# Patient Record
Sex: FEMALE | Race: WHITE | Employment: FULL TIME | URBAN - METROPOLITAN AREA
[De-identification: names, ages, dates, MRNs, and addresses within clinical notes are randomized per-mention and may not be internally consistent; named-entity substitution may affect disease eponyms.]

---

## 2017-05-28 ENCOUNTER — HOSPITAL ENCOUNTER (OUTPATIENT)
Age: 37
Discharge: HOME | End: 2017-05-28
Attending: EMERGENCY MEDICINE
Payer: COMMERCIAL

## 2017-05-28 VITALS
WEIGHT: 250 LBS | BODY MASS INDEX: 35.79 KG/M2 | TEMPERATURE: 99 F | RESPIRATION RATE: 20 BRPM | OXYGEN SATURATION: 99 % | DIASTOLIC BLOOD PRESSURE: 99 MMHG | HEART RATE: 85 BPM | HEIGHT: 70 IN | SYSTOLIC BLOOD PRESSURE: 149 MMHG

## 2017-05-28 DIAGNOSIS — J40 BRONCHITIS: Primary | ICD-10-CM

## 2017-05-28 PROCEDURE — 87430 STREP A AG IA: CPT

## 2017-05-28 PROCEDURE — 99203 OFFICE O/P NEW LOW 30 MIN: CPT

## 2017-05-28 PROCEDURE — 99204 OFFICE O/P NEW MOD 45 MIN: CPT

## 2017-05-28 RX ORDER — AMOXICILLIN AND CLAVULANATE POTASSIUM 875; 125 MG/1; MG/1
1 TABLET, FILM COATED ORAL 2 TIMES DAILY
Qty: 20 TABLET | Refills: 0 | Status: SHIPPED | OUTPATIENT
Start: 2017-05-28 | End: 2017-05-28

## 2017-05-28 RX ORDER — AMOXICILLIN AND CLAVULANATE POTASSIUM 875; 125 MG/1; MG/1
1 TABLET, FILM COATED ORAL 2 TIMES DAILY
Qty: 20 TABLET | Refills: 0 | Status: SHIPPED | OUTPATIENT
Start: 2017-05-28 | End: 2017-06-07

## 2017-05-28 RX ORDER — BENZONATATE 100 MG/1
100 CAPSULE ORAL 3 TIMES DAILY PRN
Qty: 30 CAPSULE | Refills: 0 | Status: SHIPPED | OUTPATIENT
Start: 2017-05-28 | End: 2017-06-17 | Stop reason: ALTCHOICE

## 2017-05-28 NOTE — ED INITIAL ASSESSMENT (HPI)
Pt c/o cough and URI symptoms that started Friday. Pt states that she has been using her nebulizer at home. Pt states that she had a low grade fever and c/o sore throat. Pt c/o shortness of breath.   Pt is able to speak in full sentences and her pulse ox i

## 2017-05-28 NOTE — ED PROVIDER NOTES
Patient Seen in: [address]    History   Patient presents with:  Cough/URI    Stated Complaint: sob mucus     HPI    Patient is a 38-year-old female who presents to immediate care complaining of cough and congestion for Disorder Maternal Grandmother    • Heart Disorder Maternal Grandfather          Smoking Status: Never Smoker                      Smokeless Status: Never Used                        Alcohol Use: No                Review of Systems   Constitutional: Positiv warm and dry. Vitals reviewed.            ED Course   Labs Reviewed - No data to display    MDM           Disposition and Plan     Clinical Impression:  Bronchitis  (primary encounter diagnosis)    Disposition:  Discharge    Follow-up:  Ghassan Love

## 2017-06-19 PROBLEM — J45.20 MILD INTERMITTENT ASTHMA: Status: ACTIVE | Noted: 2017-06-19

## 2017-06-19 PROBLEM — J45.20 MILD INTERMITTENT ASTHMA (HCC): Status: ACTIVE | Noted: 2017-06-19

## 2017-06-19 PROCEDURE — 81001 URINALYSIS AUTO W/SCOPE: CPT | Performed by: INTERNAL MEDICINE

## 2017-06-19 PROCEDURE — 86376 MICROSOMAL ANTIBODY EACH: CPT | Performed by: INTERNAL MEDICINE

## 2017-08-04 PROCEDURE — 82746 ASSAY OF FOLIC ACID SERUM: CPT | Performed by: INTERNAL MEDICINE

## 2017-08-04 PROCEDURE — 82607 VITAMIN B-12: CPT | Performed by: INTERNAL MEDICINE

## 2017-08-04 PROCEDURE — 81001 URINALYSIS AUTO W/SCOPE: CPT | Performed by: INTERNAL MEDICINE

## 2017-08-23 PROCEDURE — 81001 URINALYSIS AUTO W/SCOPE: CPT | Performed by: INTERNAL MEDICINE

## 2017-09-22 PROCEDURE — 86255 FLUORESCENT ANTIBODY SCREEN: CPT | Performed by: INTERNAL MEDICINE

## 2017-09-22 PROCEDURE — 84590 ASSAY OF VITAMIN A: CPT | Performed by: INTERNAL MEDICINE

## 2017-09-22 PROCEDURE — 84446 ASSAY OF VITAMIN E: CPT | Performed by: INTERNAL MEDICINE

## 2017-09-22 PROCEDURE — 83516 IMMUNOASSAY NONANTIBODY: CPT | Performed by: INTERNAL MEDICINE

## 2017-09-22 PROCEDURE — 82784 ASSAY IGA/IGD/IGG/IGM EACH: CPT | Performed by: INTERNAL MEDICINE

## 2017-11-17 PROCEDURE — 88305 TISSUE EXAM BY PATHOLOGIST: CPT | Performed by: INTERNAL MEDICINE

## 2017-12-30 ENCOUNTER — LAB ENCOUNTER (OUTPATIENT)
Dept: LAB | Age: 37
End: 2017-12-30
Attending: PODIATRIST
Payer: COMMERCIAL

## 2017-12-30 DIAGNOSIS — B35.1 DERMATOPHYTOSIS OF NAIL: Primary | ICD-10-CM

## 2017-12-30 LAB
ALBUMIN SERPL-MCNC: 4 G/DL (ref 3.5–4.8)
ALP LIVER SERPL-CCNC: 72 U/L (ref 37–98)
ALT SERPL-CCNC: 45 U/L (ref 14–54)
AST SERPL-CCNC: 20 U/L (ref 15–41)
BILIRUB DIRECT SERPL-MCNC: 0.1 MG/DL (ref 0.1–0.5)
BILIRUB SERPL-MCNC: 0.5 MG/DL (ref 0.1–2)
M PROTEIN MFR SERPL ELPH: 7.5 G/DL (ref 6.1–8.3)

## 2017-12-30 PROCEDURE — 80076 HEPATIC FUNCTION PANEL: CPT

## 2018-01-12 PROBLEM — Z90.79 S/P TAH-BSO: Status: ACTIVE | Noted: 2018-01-12

## 2018-01-12 PROBLEM — Z90.722 S/P TAH-BSO: Status: ACTIVE | Noted: 2018-01-12

## 2018-01-12 PROBLEM — Z90.710 S/P TAH-BSO: Status: ACTIVE | Noted: 2018-01-12

## 2018-01-19 PROBLEM — M70.71 ILIOPSOAS BURSITIS OF BOTH HIPS: Status: ACTIVE | Noted: 2018-01-19

## 2018-01-19 PROBLEM — M70.72 ILIOPSOAS BURSITIS OF BOTH HIPS: Status: ACTIVE | Noted: 2018-01-19

## 2018-04-21 ENCOUNTER — HOSPITAL ENCOUNTER (OUTPATIENT)
Age: 38
Discharge: HOME | End: 2018-04-21
Attending: EMERGENCY MEDICINE
Payer: COMMERCIAL

## 2018-04-21 VITALS
SYSTOLIC BLOOD PRESSURE: 144 MMHG | RESPIRATION RATE: 18 BRPM | HEART RATE: 62 BPM | TEMPERATURE: 98 F | WEIGHT: 252 LBS | BODY MASS INDEX: 37.33 KG/M2 | OXYGEN SATURATION: 99 % | DIASTOLIC BLOOD PRESSURE: 84 MMHG | HEIGHT: 69 IN

## 2018-04-21 DIAGNOSIS — H65.01 RIGHT ACUTE SEROUS OTITIS MEDIA, RECURRENCE NOT SPECIFIED: Primary | ICD-10-CM

## 2018-04-21 PROCEDURE — 99214 OFFICE O/P EST MOD 30 MIN: CPT

## 2018-04-21 PROCEDURE — 99213 OFFICE O/P EST LOW 20 MIN: CPT

## 2018-04-21 RX ORDER — DIAZEPAM 2 MG/1
2 TABLET ORAL 3 TIMES DAILY PRN
Qty: 21 TABLET | Refills: 0 | Status: SHIPPED | OUTPATIENT
Start: 2018-04-21 | End: 2019-05-08

## 2018-04-21 NOTE — ED INITIAL ASSESSMENT (HPI)
Patient presents with complaints of right ear pain with muffled hearing. Took motrin 2 hours ago. Equilibrium is off and patient feels dizzy.  Started 2 days ago

## 2018-04-21 NOTE — ED PROVIDER NOTES
Patient Seen in: [address]    History   Patient presents with:  Ear Problem Pain (neurosensory)    Stated Complaint: right ear pain    HPI    This patient complains of right ear pain which has been present for the last Oral  SpO2: 99 %  O2 Device: None (Room air)    Current:/84   Pulse 62   Temp 97.7 °F (36.5 °C) (Oral)   Resp 18   Ht 175.3 cm (5' 9\")   Wt 114.3 kg   SpO2 99%   BMI 37.21 kg/m²         Physical Exam    The patient  is awake and alert nontoxic in ap Print, Disp-[employer] tablet, uma information technology

## 2018-09-04 PROCEDURE — 87040 BLOOD CULTURE FOR BACTERIA: CPT | Performed by: INTERNAL MEDICINE

## 2018-11-27 ENCOUNTER — HOSPITAL ENCOUNTER (OUTPATIENT)
Dept: MAMMOGRAPHY | Facility: HOSPITAL | Age: 38
Discharge: HOME | End: 2018-11-27
Attending: OBSTETRICS & GYNECOLOGY
Payer: COMMERCIAL

## 2018-11-27 ENCOUNTER — HOSPITAL ENCOUNTER (OUTPATIENT)
Dept: ULTRASOUND IMAGING | Facility: HOSPITAL | Age: 38
Discharge: HOME | End: 2018-11-27
Attending: OBSTETRICS & GYNECOLOGY
Payer: COMMERCIAL

## 2018-11-27 DIAGNOSIS — N63.10 BREAST MASS, RIGHT: ICD-10-CM

## 2018-11-27 PROCEDURE — 77066 DX MAMMO INCL CAD BI: CPT | Performed by: OBSTETRICS & GYNECOLOGY

## 2018-11-27 PROCEDURE — 77062 BREAST TOMOSYNTHESIS BI: CPT | Performed by: OBSTETRICS & GYNECOLOGY

## 2018-11-27 PROCEDURE — 76642 ULTRASOUND BREAST LIMITED: CPT | Performed by: OBSTETRICS & GYNECOLOGY

## 2019-03-26 ENCOUNTER — HOSPITAL ENCOUNTER (OUTPATIENT)
Dept: MRI IMAGING | Facility: HOSPITAL | Age: 39
Discharge: HOME | End: 2019-03-26
Attending: SURGERY
Payer: COMMERCIAL

## 2019-03-26 DIAGNOSIS — Z80.3 FAMILY HISTORY OF BREAST CANCER IN MOTHER: ICD-10-CM

## 2019-03-26 DIAGNOSIS — N63.0 BREAST LUMP IN FEMALE: ICD-10-CM

## 2019-03-26 DIAGNOSIS — N63.10 BREAST MASS, RIGHT: ICD-10-CM

## 2019-03-26 DIAGNOSIS — Z85.43 HISTORY OF OVARIAN CANCER: ICD-10-CM

## 2019-03-26 LAB — CREAT BLD-MCNC: 0.7 MG/DL (ref 0.5–1.5)

## 2019-03-26 PROCEDURE — 82565 ASSAY OF CREATININE: CPT

## 2019-03-26 PROCEDURE — 77049 MRI BREAST C-+ W/CAD BI: CPT | Performed by: SURGERY

## 2019-03-26 PROCEDURE — A9575 INJ GADOTERATE MEGLUMI 0.1ML: HCPCS | Performed by: SURGERY

## 2019-04-04 NOTE — PROGRESS NOTES
Paresh Bonilla, please notify patient no discrete mass for biopsy at this time  Will repeat MRI per radiologist recommendation in 6 months since there is no previous baseline  Please order MRI of the breasts for 10/2019

## 2019-04-25 PROBLEM — C56.9 OVARIAN CANCER (HCC): Status: ACTIVE | Noted: 2019-04-25

## 2019-04-25 PROBLEM — Z87.42 HISTORY OF ENDOMETRIOSIS: Status: ACTIVE | Noted: 2019-04-25

## 2019-05-08 PROBLEM — M72.2 PLANTAR FASCIITIS: Status: ACTIVE | Noted: 2019-05-08

## 2019-06-12 PROBLEM — E66.9 OBESITY (BMI 30-39.9): Status: ACTIVE | Noted: 2019-06-12

## 2019-08-29 PROBLEM — M54.16 LUMBAR RADICULOPATHY: Status: ACTIVE | Noted: 2019-08-29

## 2019-10-04 ENCOUNTER — HOSPITAL ENCOUNTER (OUTPATIENT)
Dept: MRI IMAGING | Facility: HOSPITAL | Age: 39
Discharge: HOME | End: 2019-10-04
Attending: SURGERY
Payer: COMMERCIAL

## 2019-10-04 DIAGNOSIS — N63.0 BREAST MASS: ICD-10-CM

## 2019-10-04 PROCEDURE — 77049 MRI BREAST C-+ W/CAD BI: CPT | Performed by: SURGERY

## 2019-10-04 PROCEDURE — 82565 ASSAY OF CREATININE: CPT

## 2019-10-04 PROCEDURE — A9575 INJ GADOTERATE MEGLUMI 0.1ML: HCPCS | Performed by: SURGERY

## 2019-10-05 NOTE — PROGRESS NOTES
Patient was notified with result of the MRI  Repeat MRI in 6 months for follow up of the nodules on the left breast  Her last mammogram was in 11/2018 and since she got her MRI will resume in 11/2020    Cathy Montana, please order bilateral MRI of the breasts for

## 2021-05-28 ENCOUNTER — HOSPITAL ENCOUNTER (OUTPATIENT)
Dept: GENERAL RADIOLOGY | Facility: HOSPITAL | Age: 41
Discharge: HOME | End: 2021-05-28
Attending: PHYSICIAN ASSISTANT
Payer: COMMERCIAL

## 2021-05-28 ENCOUNTER — OFFICE VISIT (OUTPATIENT)
Dept: SURGERY | Facility: CLINIC | Age: 41
End: 2021-05-28
Payer: COMMERCIAL

## 2021-05-28 VITALS — SYSTOLIC BLOOD PRESSURE: 132 MMHG | HEART RATE: 88 BPM | DIASTOLIC BLOOD PRESSURE: 80 MMHG

## 2021-05-28 DIAGNOSIS — M54.12 CERVICAL RADICULOPATHY: Primary | ICD-10-CM

## 2021-05-28 DIAGNOSIS — M54.12 CERVICAL RADICULOPATHY: ICD-10-CM

## 2021-05-28 PROCEDURE — 72052 X-RAY EXAM NECK SPINE 6/>VWS: CPT | Performed by: PHYSICIAN ASSISTANT

## 2021-05-28 PROCEDURE — 3075F SYST BP GE 130 - 139MM HG: CPT | Performed by: PHYSICIAN ASSISTANT

## 2021-05-28 PROCEDURE — 96372 THER/PROPH/DIAG INJ SC/IM: CPT | Performed by: PHYSICIAN ASSISTANT

## 2021-05-28 PROCEDURE — 99203 OFFICE O/P NEW LOW 30 MIN: CPT | Performed by: PHYSICIAN ASSISTANT

## 2021-05-28 PROCEDURE — 3079F DIAST BP 80-89 MM HG: CPT | Performed by: PHYSICIAN ASSISTANT

## 2021-05-28 RX ORDER — KETOROLAC TROMETHAMINE 30 MG/ML
30 INJECTION, SOLUTION INTRAMUSCULAR; INTRAVENOUS ONCE
Status: COMPLETED | OUTPATIENT
Start: 2021-05-28 | End: 2021-05-28

## 2021-05-28 RX ORDER — MELOXICAM 15 MG/1
15 TABLET ORAL DAILY
Qty: 30 TABLET | Refills: 0 | Status: SHIPPED | OUTPATIENT
Start: 2021-05-28

## 2021-05-28 RX ADMIN — KETOROLAC TROMETHAMINE 30 MG: 30 INJECTION, SOLUTION INTRAMUSCULAR; INTRAVENOUS at 10:30:00

## 2021-05-28 NOTE — H&P
Neurosurgery Clinic Visit  2021    Leland Shi PCP:  Chad Nru MD    1980 MRN [id number]       CC:  Left Arm numbness/tingling    HPI:    Niraj is a very pleasant 35year old female who presents with over 6 weeks of left arm symptom breast cancer 43   • Cancer Father         Prostate   • Other (Other) Father         renal insuff   • Heart Disorder Maternal Grandmother    • Heart Disorder Maternal Grandfather    • Other (Other) Sister         RA   • Diabetes Sister    • Ovarian treatment. F/u with Dr. You Bey in 4-6 weeks. Imaging was reviewed with the patient and explained in detail. The diagnosis, treatment options, and expectations were discussed. All questions were answered to satisfaction.   Total visit time = 30 Minute

## 2021-05-28 NOTE — PROGRESS NOTES
Pt has been having symptoms for over 2 months    Headaches, dizziness, numbness and tingling  in left hand and into fingers   is weak in left hand with things occasional slipping out of her hands    Denies tripping or falling, denies numbness in toes,

## 2021-06-09 ENCOUNTER — TELEPHONE (OUTPATIENT)
Dept: NEUROLOGY | Facility: CLINIC | Age: 41
End: 2021-06-09

## 2021-06-09 ENCOUNTER — OFFICE VISIT (OUTPATIENT)
Dept: PAIN CLINIC | Facility: CLINIC | Age: 41
End: 2021-06-09
Payer: COMMERCIAL

## 2021-06-09 VITALS
OXYGEN SATURATION: 98 % | DIASTOLIC BLOOD PRESSURE: 70 MMHG | SYSTOLIC BLOOD PRESSURE: 124 MMHG | TEMPERATURE: 97 F | RESPIRATION RATE: 16 BRPM | HEART RATE: 75 BPM | BODY MASS INDEX: 38.65 KG/M2 | HEIGHT: 70 IN | WEIGHT: 270 LBS

## 2021-06-09 DIAGNOSIS — M54.12 CERVICAL RADICULOPATHY: Primary | ICD-10-CM

## 2021-06-09 PROCEDURE — 3078F DIAST BP <80 MM HG: CPT | Performed by: ANESTHESIOLOGY

## 2021-06-09 PROCEDURE — 3074F SYST BP LT 130 MM HG: CPT | Performed by: ANESTHESIOLOGY

## 2021-06-09 PROCEDURE — 99243 OFF/OP CNSLTJ NEW/EST LOW 30: CPT | Performed by: ANESTHESIOLOGY

## 2021-06-09 PROCEDURE — 3008F BODY MASS INDEX DOCD: CPT | Performed by: ANESTHESIOLOGY

## 2021-06-09 RX ORDER — TRAMADOL HYDROCHLORIDE 50 MG/1
50 TABLET ORAL EVERY 8 HOURS PRN
Qty: 21 TABLET | Refills: 0 | Status: SHIPPED | OUTPATIENT
Start: 2021-06-09

## 2021-06-09 NOTE — H&P
Name: Kenna Abraham   : 1980   DOS: 2021     Chief complaint: Cervical radiculopathy    History of present illness:  Kenna Abraham is a 35year old female referred for evaluation of neck pain.   The patient reports a 2-month history of pain begi (PROAIR HFA) 108 (90 Base) MCG/ACT Inhalation Aero Soln Inhale 1 puff into the lungs every 6 (six) hours as needed for Wheezing.  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 Discussed treatment options for this including a trial of cervical epidural steroid injection. The patient would like to move forward. Risk and benefits discussed.   Additionally, I did offer the patient a short term prescription

## 2021-06-09 NOTE — TELEPHONE ENCOUNTER
Prior authorization request completed for: cervical epidural steroid injection    Authorization # No Authorization Required   Authorization dates: NA  CPT codes approved: 36096  Number of visits/dates of service approved: 1  Physician: Artur Lerner  Facility:Lab/

## 2021-06-09 NOTE — PROGRESS NOTES
HPI/Subjective:   Patient ID: Derrick Gutierrez is a 35year old female. HPI    History/Other:   Review of Systems  Current Outpatient Medications   Medication Sig Dispense Refill   • Meloxicam 15 MG Oral Tab Take 1 tablet (15 mg total) by mouth daily.  30 t OR Take 1 tablet by mouth every other day.          Allergies:  Zoloft                  NAUSEA AND VOMITING  Chicken                 DIARRHEA  Gluten Flour            DIARRHEA  Hydrocodone             NAUSEA AND VOMITING  Morphine [Morphine *        Objecti

## 2021-06-10 NOTE — TELEPHONE ENCOUNTER
[address]  PRE-PROCEDURE INSTRUCTIONS WITHOUT SEDATION            Procedure- RODNEY   Appointment Date: 6/15/2021  Check-In Time: 11:30 AM       ** TO AVOID CANCELLATION AND/OR RESCHEDULING: PLEASE CALL [facility] PRE-PROCEDURE LINE -[phone] • 81mg               24 hours  • Greater than 81mg but less than 325mg   5 days  • 325mg and greater                  7 days  • Coumadin       5 days  • Procedure may be cancelled if INR is elevated.    • Excedrin (with aspirin) 7 days  • Plavix (Clopidogre 20 minutes off) for comfort.       ** TO AVOID CANCELLATION AND/OR RESCHEDULING: PLEASE CALL [facility] PRE-PROCEDURE LINE -[phone] FOR DETAILED INSTRUCTIONS FIVE TO SEVEN DAYS PRIOR TO PROCEDURE**

## 2021-06-10 NOTE — TELEPHONE ENCOUNTER
Anesthesia Type Local    Provider Zan Palacios    Location Lab    Procedure Cervical OSIEL    Medical clearance requested (will send to Pain Navigator) No    Patient has Medicare coverage?  No    Comments (Please list entire procedure name here.) cervical epidural st

## 2021-06-15 ENCOUNTER — APPOINTMENT (OUTPATIENT)
Dept: GENERAL RADIOLOGY | Facility: HOSPITAL | Age: 41
End: 2021-06-15
Attending: ANESTHESIOLOGY
Payer: COMMERCIAL

## 2021-06-15 ENCOUNTER — HOSPITAL ENCOUNTER (OUTPATIENT)
Facility: HOSPITAL | Age: 41
Setting detail: HOSPITAL OUTPATIENT SURGERY
Discharge: HOME | End: 2021-06-15
Attending: ANESTHESIOLOGY | Admitting: ANESTHESIOLOGY
Payer: COMMERCIAL

## 2021-06-15 VITALS
HEART RATE: 53 BPM | RESPIRATION RATE: 16 BRPM | SYSTOLIC BLOOD PRESSURE: 142 MMHG | OXYGEN SATURATION: 100 % | DIASTOLIC BLOOD PRESSURE: 86 MMHG | TEMPERATURE: 98 F

## 2021-06-15 DIAGNOSIS — M54.12 CERVICAL RADICULOPATHY: ICD-10-CM

## 2021-06-15 PROCEDURE — B01B1ZZ FLUOROSCOPY OF SPINAL CORD USING LOW OSMOLAR CONTRAST: ICD-10-PCS | Performed by: ANESTHESIOLOGY

## 2021-06-15 PROCEDURE — 3E0S33Z INTRODUCTION OF ANTI-INFLAMMATORY INTO EPIDURAL SPACE, PERCUTANEOUS APPROACH: ICD-10-PCS | Performed by: ANESTHESIOLOGY

## 2021-06-15 RX ORDER — ONDANSETRON 2 MG/ML
4 INJECTION INTRAMUSCULAR; INTRAVENOUS ONCE AS NEEDED
Status: DISCONTINUED | OUTPATIENT
Start: 2021-06-15 | End: 2021-06-15

## 2021-06-15 RX ORDER — LIDOCAINE HYDROCHLORIDE 10 MG/ML
INJECTION, SOLUTION EPIDURAL; INFILTRATION; INTRACAUDAL; PERINEURAL
Status: DISCONTINUED | OUTPATIENT
Start: 2021-06-15 | End: 2021-06-15

## 2021-06-15 RX ORDER — DIPHENHYDRAMINE HYDROCHLORIDE 50 MG/ML
50 INJECTION INTRAMUSCULAR; INTRAVENOUS ONCE AS NEEDED
Status: DISCONTINUED | OUTPATIENT
Start: 2021-06-15 | End: 2021-06-15

## 2021-06-15 RX ORDER — SODIUM CHLORIDE 9 MG/ML
INJECTION INTRAVENOUS
Status: DISCONTINUED | OUTPATIENT
Start: 2021-06-15 | End: 2021-06-15

## 2021-06-15 RX ORDER — DEXAMETHASONE SODIUM PHOSPHATE 10 MG/ML
INJECTION, SOLUTION INTRAMUSCULAR; INTRAVENOUS
Status: DISCONTINUED | OUTPATIENT
Start: 2021-06-15 | End: 2021-06-15

## 2021-06-15 RX ORDER — SODIUM CHLORIDE, SODIUM LACTATE, POTASSIUM CHLORIDE, CALCIUM CHLORIDE 600; 310; 30; 20 MG/100ML; MG/100ML; MG/100ML; MG/100ML
100 INJECTION, SOLUTION INTRAVENOUS CONTINUOUS
Status: DISCONTINUED | OUTPATIENT
Start: 2021-06-15 | End: 2021-06-15

## 2021-06-15 NOTE — H&P
History & Physical Examination    Patient Name: Dagoberto Madden  MRN: [id number]  CSN: [id number]  YOB: 1980    Pre-Operative Diagnosis:  Cervical radiculopathy [M54.12]    Present Illness: Patient with cervical radiculopathy for epidural steroi Disp: 5 tablet, Rfl: 0, Unknown at Unknown time  hydrochlorothiazide 12.5 MG Oral Cap, Take 1 capsule (12.5 mg total) by mouth every morning., Disp: 90 capsule, Rfl: 3, 6/8/2021  albuterol sulfate (2.5 MG/3ML) 0.083% Inhalation Nebu Soln, Take 3 mL (2.5 mg Prostate   • Other (Other) Father         renal insuff   • Heart Disorder Maternal Grandmother    • Heart Disorder Maternal Grandfather    • Other (Other) Sister         RA   • Diabetes Sister    • Ovarian Cancer Self 21        WITH CHEMO   • Breast Cancer

## 2021-06-15 NOTE — OR NURSING
Patient injection site appears clean and intact Patient states that she took her bp, this am and it was 152 85 she states that she takes her meds at night and really dont monitor it to compare, she states that she is on her way to work and denies headaches, floaters, blurred vision, dizziness etc. She do however verbalized that theres a dullness in the front of her head. Patient states that she has not taken the meloxacam and she wanted to let Dr. Carlson Know. Patient advised that  is not here on fridays however message will be sent to her but in the interim I advised her per  to decrease her salt intake, increase her water, and monitor bp once or twice a day and if she should feel bad or exp any of the above mentioned symptoms to seek medical att be it the u/c or the ER and monitor and track over the weekend and call on Monday if they are still elevated and patient expressed understanding.emma

## 2021-06-15 NOTE — OPERATIVE REPORT
[facility]  Operative Report  6/15/2021     Pablo Hassan Patient Status:  Hospital Outpatient Surgery    1980 MRN [id number]   Location [address] [place] [address] Attending Guy Guzman MD   [facility] Day # 0 PCP Dayo Molina recovered and was discharged to a responsible adult after discharge criteria were met. Complications: None. Follow up: The patient was followed in the pain clinic as needed basis.           Arie Velez MD

## 2021-06-15 NOTE — OR NURSING
PATIENT VITAL SIGNS STABLE AND PATIENT HAD NO C/O PAIN OR NAUSEA DURING POST-OP RECOVERY. ICE PACK APPLIED TO NECK .

## 2021-07-02 ENCOUNTER — OFFICE VISIT (OUTPATIENT)
Dept: SURGERY | Facility: CLINIC | Age: 41
End: 2021-07-02
Payer: COMMERCIAL

## 2021-07-02 VITALS — HEART RATE: 76 BPM | DIASTOLIC BLOOD PRESSURE: 80 MMHG | SYSTOLIC BLOOD PRESSURE: 126 MMHG

## 2021-07-02 DIAGNOSIS — M54.12 CERVICAL RADICULOPATHY: Primary | ICD-10-CM

## 2021-07-02 PROCEDURE — 3074F SYST BP LT 130 MM HG: CPT | Performed by: PHYSICIAN ASSISTANT

## 2021-07-02 PROCEDURE — 3079F DIAST BP 80-89 MM HG: CPT | Performed by: PHYSICIAN ASSISTANT

## 2021-07-02 PROCEDURE — 99213 OFFICE O/P EST LOW 20 MIN: CPT | Performed by: PHYSICIAN ASSISTANT

## 2021-07-02 RX ORDER — HYDROCODONE BITARTRATE AND ACETAMINOPHEN 5; 325 MG/1; MG/1
1 TABLET ORAL EVERY 6 HOURS PRN
Qty: 20 TABLET | Refills: 0 | Status: SHIPPED | OUTPATIENT
Start: 2021-07-02

## 2021-07-02 NOTE — PROGRESS NOTES
Neurosurgery Clinic Visit  2021    Prince Thornton PCP:  Ángel Rivera MD    1980 MRN [id number]     CC:  Left Arm numbness/tingling    HPI:    Aurora Duckworth is here for f/u. She has not been able to start PT due to her work hours.   She had a vasov History    Socioeconomic History      Marital status:       Spouse name: Not on file      Number of children: Not on file      Years of education: Not on file      Highest education level: Not on file    Tobacco Use      Smoking status: Never Smoker Triceps Patellar Ankle Leo's Clonus   Right 2+ 2+ 2+ 2+ 2+ No No   Left 2+ 2+ 2+ 2+ 2+ No No      A/P:     1. Cervical radiculopathy       Sheri Calvo is suffering from cervical radiculopathy secondary to foraminal stenosis.   Her symptoms are more C6 but sh

## 2021-07-02 NOTE — PROGRESS NOTES
Patient here for follow up visit to be evaluated post nerve block. Patient reports pain in the left shoulder, neck, radiating to the arm. Patient reported no relief from the nerve block. Pain began approximately 5 months ago. Pain is constant.  Patient

## 2021-10-06 ENCOUNTER — OFFICE VISIT (OUTPATIENT)
Dept: SURGERY | Facility: CLINIC | Age: 41
End: 2021-10-06
Payer: COMMERCIAL

## 2021-10-06 VITALS
SYSTOLIC BLOOD PRESSURE: 122 MMHG | HEIGHT: 70 IN | WEIGHT: 280 LBS | BODY MASS INDEX: 40.09 KG/M2 | DIASTOLIC BLOOD PRESSURE: 80 MMHG

## 2021-10-06 DIAGNOSIS — M47.22 CERVICAL SPONDYLOSIS WITH RADICULOPATHY: Primary | ICD-10-CM

## 2021-10-06 PROCEDURE — 3008F BODY MASS INDEX DOCD: CPT | Performed by: NEUROLOGICAL SURGERY

## 2021-10-06 PROCEDURE — 3079F DIAST BP 80-89 MM HG: CPT | Performed by: NEUROLOGICAL SURGERY

## 2021-10-06 PROCEDURE — 3074F SYST BP LT 130 MM HG: CPT | Performed by: NEUROLOGICAL SURGERY

## 2021-10-06 PROCEDURE — 99214 OFFICE O/P EST MOD 30 MIN: CPT | Performed by: NEUROLOGICAL SURGERY

## 2021-10-06 NOTE — PROGRESS NOTES
Was doing well with PT, has been doing stretches at home  Until about 3 weeks ago, had a lot of symptoms back, but not as severe as they were before    Now having issues with Right side, where before was only Left side

## 2021-10-06 NOTE — PROGRESS NOTES
[facility]  Neurological Surgery Follow Up Clinic Note    Walker Dress 35year old, female    1980 MRN [id number]   PCP Kristan Mackey MD Allergies   Zoloft                  NAUSEA AND VOMITING  Chicken                 Hernán Davis right side involving the thumb and index finger, that leads me to believe that there may be a new issue with the C5-6 level which may need to be included.   An anterior cervical discectomy with fusion (ACDF) is a common surgical procedure to treat damaged c going well. Maintaining a healthy attitude, a well-balanced diet, and getting plenty of rest are also great ways to speed up recovery. I do not think the patient is a good candidate for total disc arthropathy at C6-7.   Given her habitus imaging that dakotah

## 2022-11-14 ENCOUNTER — HOSPITAL ENCOUNTER (OUTPATIENT)
Age: 42
Discharge: HOME | End: 2022-11-14
Payer: COMMERCIAL

## 2022-11-14 ENCOUNTER — APPOINTMENT (OUTPATIENT)
Dept: GENERAL RADIOLOGY | Age: 42
End: 2022-11-14
Attending: NURSE PRACTITIONER
Payer: COMMERCIAL

## 2022-11-14 VITALS
RESPIRATION RATE: 20 BRPM | SYSTOLIC BLOOD PRESSURE: 135 MMHG | TEMPERATURE: 98 F | DIASTOLIC BLOOD PRESSURE: 82 MMHG | OXYGEN SATURATION: 100 % | HEART RATE: 71 BPM

## 2022-11-14 DIAGNOSIS — M54.16 LUMBAR RADICULOPATHY: Primary | ICD-10-CM

## 2022-11-14 LAB
BILIRUB UR QL STRIP: NEGATIVE
COLOR UR: YELLOW
GLUCOSE UR STRIP-MCNC: NEGATIVE MG/DL
KETONES UR STRIP-MCNC: NEGATIVE MG/DL
NITRITE UR QL STRIP: NEGATIVE
PH UR STRIP: 6.5 [PH]
PROT UR STRIP-MCNC: NEGATIVE MG/DL
SP GR UR STRIP: 1.02
UROBILINOGEN UR STRIP-ACNC: <2 MG/DL

## 2022-11-14 PROCEDURE — 81002 URINALYSIS NONAUTO W/O SCOPE: CPT | Performed by: NURSE PRACTITIONER

## 2022-11-14 PROCEDURE — 96372 THER/PROPH/DIAG INJ SC/IM: CPT | Performed by: NURSE PRACTITIONER

## 2022-11-14 PROCEDURE — 72100 X-RAY EXAM L-S SPINE 2/3 VWS: CPT | Performed by: NURSE PRACTITIONER

## 2022-11-14 PROCEDURE — 99203 OFFICE O/P NEW LOW 30 MIN: CPT | Performed by: NURSE PRACTITIONER

## 2022-11-14 RX ORDER — PREDNISONE 20 MG/1
40 TABLET ORAL DAILY
Qty: 8 TABLET | Refills: 0 | Status: SHIPPED | OUTPATIENT
Start: 2022-11-14 | End: 2022-11-18

## 2022-11-14 RX ORDER — CYCLOBENZAPRINE HCL 10 MG
10 TABLET ORAL ONCE
Status: COMPLETED | OUTPATIENT
Start: 2022-11-14 | End: 2022-11-14

## 2022-11-14 RX ORDER — PREDNISONE 20 MG/1
60 TABLET ORAL ONCE
Status: COMPLETED | OUTPATIENT
Start: 2022-11-14 | End: 2022-11-14

## 2022-11-14 RX ORDER — KETOROLAC TROMETHAMINE 30 MG/ML
30 INJECTION, SOLUTION INTRAMUSCULAR; INTRAVENOUS ONCE
Status: COMPLETED | OUTPATIENT
Start: 2022-11-14 | End: 2022-11-14

## 2022-11-14 RX ORDER — CYCLOBENZAPRINE HCL 10 MG
10 TABLET ORAL 3 TIMES DAILY PRN
Qty: 20 TABLET | Refills: 0 | Status: SHIPPED | OUTPATIENT
Start: 2022-11-14 | End: 2022-11-18

## 2022-11-14 NOTE — ED INITIAL ASSESSMENT (HPI)
Pt c/o mid low back pain radiating to buttocks and down B legs after having a coughing fit x3 days. States worse with movement. States seen by pulmonologist this am.  No falls or injury. No bowel or bladder incontinence. No urinary symptoms.

## 2022-11-14 NOTE — DISCHARGE INSTRUCTIONS
Make sure to stay well hydrated with clear fluids. Take the steroid daily as directed however do not begin this until tomorrow as you had your first dose here at the immediate care today. If additional pain control is needed, you may use the muscle relaxant as directed however be aware this medication can cause drowsiness. You can use Tylenol or Motrin every 6 hours to control fever or discomfort. You can use both Tylenol and Motrin, but alternate them so that you're getting one every 3 hours. Warm salt water gargles, throat lozenges, and warmed beverages can be additionally helpful for a sore throat. Using a humidifier in the bedroom at night, and sleeping propped up on pillows/somewhat of an incline can also be helpful. Cover your cough and wash your hands frequently to prevent the spread of infection. Follow up with your primary care provider in the next 2-3 days. Seek additional care in the ER for fever that is not controlled with Tylenol and Motrin, difficulty breathing or shortness of breath, chest pain, or any new/worsening symptoms.

## 2025-01-17 RX ORDER — LEVOTHYROXINE SODIUM 50 UG/1
50 TABLET ORAL
COMMUNITY

## 2025-01-20 ENCOUNTER — HOSPITAL ENCOUNTER (OUTPATIENT)
Facility: HOSPITAL | Age: 45
Discharge: HOME | End: 2025-01-21
Attending: OTOLARYNGOLOGY | Admitting: OTOLARYNGOLOGY
Payer: COMMERCIAL

## 2025-01-20 ENCOUNTER — ANESTHESIA (OUTPATIENT)
Dept: SURGERY | Facility: HOSPITAL | Age: 45
End: 2025-01-20
Payer: COMMERCIAL

## 2025-01-20 ENCOUNTER — ANESTHESIA EVENT (OUTPATIENT)
Dept: SURGERY | Facility: HOSPITAL | Age: 45
End: 2025-01-20
Payer: COMMERCIAL

## 2025-01-20 PROBLEM — Z98.890 S/P NASAL SEPTOPLASTY: Status: ACTIVE | Noted: 2025-01-20

## 2025-01-20 PROCEDURE — 94640 AIRWAY INHALATION TREATMENT: CPT

## 2025-01-20 PROCEDURE — 88305 TISSUE EXAM BY PATHOLOGIST: CPT | Performed by: OTOLARYNGOLOGY

## 2025-01-20 PROCEDURE — 09BL0ZZ EXCISION OF NASAL TURBINATE, OPEN APPROACH: ICD-10-PCS | Performed by: OTOLARYNGOLOGY

## 2025-01-20 PROCEDURE — 09BM0ZZ EXCISION OF NASAL SEPTUM, OPEN APPROACH: ICD-10-PCS | Performed by: OTOLARYNGOLOGY

## 2025-01-20 PROCEDURE — 09BK0ZZ EXCISION OF NASAL MUCOSA AND SOFT TISSUE, OPEN APPROACH: ICD-10-PCS | Performed by: OTOLARYNGOLOGY

## 2025-01-20 PROCEDURE — 94644 CONT INHLJ TX 1ST HOUR: CPT

## 2025-01-20 RX ORDER — LEVOTHYROXINE SODIUM 50 UG/1
50 TABLET ORAL
Status: DISCONTINUED | OUTPATIENT
Start: 2025-01-21 | End: 2025-01-21

## 2025-01-20 RX ORDER — MIDAZOLAM HYDROCHLORIDE 1 MG/ML
1 INJECTION INTRAMUSCULAR; INTRAVENOUS EVERY 5 MIN PRN
Status: DISCONTINUED | OUTPATIENT
Start: 2025-01-20 | End: 2025-01-20 | Stop reason: HOSPADM

## 2025-01-20 RX ORDER — ONDANSETRON 2 MG/ML
4 INJECTION INTRAMUSCULAR; INTRAVENOUS EVERY 6 HOURS PRN
Status: DISCONTINUED | OUTPATIENT
Start: 2025-01-20 | End: 2025-01-20 | Stop reason: HOSPADM

## 2025-01-20 RX ORDER — CEFAZOLIN SODIUM 1 G/3ML
INJECTION, POWDER, FOR SOLUTION INTRAMUSCULAR; INTRAVENOUS AS NEEDED
Status: DISCONTINUED | OUTPATIENT
Start: 2025-01-20 | End: 2025-01-20 | Stop reason: SURG

## 2025-01-20 RX ORDER — ACETAMINOPHEN 500 MG
1000 TABLET ORAL ONCE
Status: DISCONTINUED | OUTPATIENT
Start: 2025-01-20 | End: 2025-01-20 | Stop reason: HOSPADM

## 2025-01-20 RX ORDER — PROCHLORPERAZINE EDISYLATE 5 MG/ML
5 INJECTION INTRAMUSCULAR; INTRAVENOUS EVERY 8 HOURS PRN
Status: DISCONTINUED | OUTPATIENT
Start: 2025-01-20 | End: 2025-01-20 | Stop reason: HOSPADM

## 2025-01-20 RX ORDER — LIDOCAINE HYDROCHLORIDE AND EPINEPHRINE 10; 10 MG/ML; UG/ML
INJECTION, SOLUTION INFILTRATION; PERINEURAL AS NEEDED
Status: DISCONTINUED | OUTPATIENT
Start: 2025-01-20 | End: 2025-01-20 | Stop reason: HOSPADM

## 2025-01-20 RX ORDER — ROCURONIUM BROMIDE 10 MG/ML
INJECTION, SOLUTION INTRAVENOUS AS NEEDED
Status: DISCONTINUED | OUTPATIENT
Start: 2025-01-20 | End: 2025-01-20 | Stop reason: SURG

## 2025-01-20 RX ORDER — SODIUM CHLORIDE, SODIUM LACTATE, POTASSIUM CHLORIDE, CALCIUM CHLORIDE 600; 310; 30; 20 MG/100ML; MG/100ML; MG/100ML; MG/100ML
INJECTION, SOLUTION INTRAVENOUS CONTINUOUS
Status: DISCONTINUED | OUTPATIENT
Start: 2025-01-20 | End: 2025-01-20 | Stop reason: HOSPADM

## 2025-01-20 RX ORDER — HYDROMORPHONE HYDROCHLORIDE 1 MG/ML
0.2 INJECTION, SOLUTION INTRAMUSCULAR; INTRAVENOUS; SUBCUTANEOUS EVERY 5 MIN PRN
Status: DISCONTINUED | OUTPATIENT
Start: 2025-01-20 | End: 2025-01-20 | Stop reason: HOSPADM

## 2025-01-20 RX ORDER — HYDROMORPHONE HYDROCHLORIDE 1 MG/ML
INJECTION, SOLUTION INTRAMUSCULAR; INTRAVENOUS; SUBCUTANEOUS
Status: COMPLETED
Start: 2025-01-20 | End: 2025-01-20

## 2025-01-20 RX ORDER — BACITRACIN ZINC AND POLYMYXIN B SULFATE 500; 10000 [USP'U]/G; [USP'U]/G
OINTMENT TOPICAL AS NEEDED
Status: DISCONTINUED | OUTPATIENT
Start: 2025-01-20 | End: 2025-01-20 | Stop reason: HOSPADM

## 2025-01-20 RX ORDER — DEXAMETHASONE SODIUM PHOSPHATE 4 MG/ML
VIAL (ML) INJECTION AS NEEDED
Status: DISCONTINUED | OUTPATIENT
Start: 2025-01-20 | End: 2025-01-20 | Stop reason: SURG

## 2025-01-20 RX ORDER — HYDROMORPHONE HYDROCHLORIDE 1 MG/ML
0.4 INJECTION, SOLUTION INTRAMUSCULAR; INTRAVENOUS; SUBCUTANEOUS EVERY 5 MIN PRN
Status: DISCONTINUED | OUTPATIENT
Start: 2025-01-20 | End: 2025-01-20 | Stop reason: HOSPADM

## 2025-01-20 RX ORDER — ONDANSETRON 2 MG/ML
INJECTION INTRAMUSCULAR; INTRAVENOUS AS NEEDED
Status: DISCONTINUED | OUTPATIENT
Start: 2025-01-20 | End: 2025-01-20 | Stop reason: SURG

## 2025-01-20 RX ORDER — MEPERIDINE HYDROCHLORIDE 25 MG/ML
12.5 INJECTION INTRAMUSCULAR; INTRAVENOUS; SUBCUTANEOUS AS NEEDED
Status: DISCONTINUED | OUTPATIENT
Start: 2025-01-20 | End: 2025-01-20 | Stop reason: HOSPADM

## 2025-01-20 RX ORDER — ONDANSETRON 4 MG/1
8 TABLET, ORALLY DISINTEGRATING ORAL EVERY 8 HOURS PRN
Status: DISCONTINUED | OUTPATIENT
Start: 2025-01-20 | End: 2025-01-21

## 2025-01-20 RX ORDER — ALBUTEROL SULFATE 90 UG/1
1 INHALANT RESPIRATORY (INHALATION) EVERY 6 HOURS PRN
Status: DISCONTINUED | OUTPATIENT
Start: 2025-01-20 | End: 2025-01-21

## 2025-01-20 RX ORDER — LIDOCAINE HYDROCHLORIDE 10 MG/ML
INJECTION, SOLUTION EPIDURAL; INFILTRATION; INTRACAUDAL; PERINEURAL AS NEEDED
Status: DISCONTINUED | OUTPATIENT
Start: 2025-01-20 | End: 2025-01-20 | Stop reason: SURG

## 2025-01-20 RX ORDER — OXYCODONE HYDROCHLORIDE 5 MG/1
5 TABLET ORAL ONCE
Status: DISCONTINUED | OUTPATIENT
Start: 2025-01-20 | End: 2025-01-21

## 2025-01-20 RX ORDER — TRAMADOL HYDROCHLORIDE 50 MG/1
50 TABLET ORAL EVERY 6 HOURS PRN
Status: DISCONTINUED | OUTPATIENT
Start: 2025-01-20 | End: 2025-01-21

## 2025-01-20 RX ORDER — TRAMADOL HYDROCHLORIDE 50 MG/1
100 TABLET ORAL EVERY 6 HOURS PRN
Status: DISCONTINUED | OUTPATIENT
Start: 2025-01-20 | End: 2025-01-21

## 2025-01-20 RX ORDER — LABETALOL HYDROCHLORIDE 5 MG/ML
INJECTION, SOLUTION INTRAVENOUS
Status: DISCONTINUED
Start: 2025-01-20 | End: 2025-01-20 | Stop reason: WASHOUT

## 2025-01-20 RX ORDER — SODIUM CHLORIDE, SODIUM LACTATE, POTASSIUM CHLORIDE, CALCIUM CHLORIDE 600; 310; 30; 20 MG/100ML; MG/100ML; MG/100ML; MG/100ML
INJECTION, SOLUTION INTRAVENOUS CONTINUOUS
Status: DISCONTINUED | OUTPATIENT
Start: 2025-01-20 | End: 2025-01-21

## 2025-01-20 RX ORDER — ECHINACEA PURPUREA EXTRACT 125 MG
1 TABLET ORAL
Status: DISCONTINUED | OUTPATIENT
Start: 2025-01-20 | End: 2025-01-21

## 2025-01-20 RX ORDER — LIDOCAINE HYDROCHLORIDE 40 MG/ML
SOLUTION TOPICAL AS NEEDED
Status: DISCONTINUED | OUTPATIENT
Start: 2025-01-20 | End: 2025-01-20 | Stop reason: SURG

## 2025-01-20 RX ORDER — OXYMETAZOLINE HYDROCHLORIDE 0.05 G/100ML
SPRAY NASAL AS NEEDED
Status: DISCONTINUED | OUTPATIENT
Start: 2025-01-20 | End: 2025-01-20 | Stop reason: HOSPADM

## 2025-01-20 RX ORDER — ACETAMINOPHEN 500 MG
500 TABLET ORAL EVERY 6 HOURS PRN
Status: DISCONTINUED | OUTPATIENT
Start: 2025-01-20 | End: 2025-01-21

## 2025-01-20 RX ORDER — LABETALOL HYDROCHLORIDE 5 MG/ML
5 INJECTION, SOLUTION INTRAVENOUS EVERY 5 MIN PRN
Status: DISCONTINUED | OUTPATIENT
Start: 2025-01-20 | End: 2025-01-20 | Stop reason: HOSPADM

## 2025-01-20 RX ORDER — HYDROMORPHONE HYDROCHLORIDE 1 MG/ML
0.6 INJECTION, SOLUTION INTRAMUSCULAR; INTRAVENOUS; SUBCUTANEOUS EVERY 5 MIN PRN
Status: DISCONTINUED | OUTPATIENT
Start: 2025-01-20 | End: 2025-01-20 | Stop reason: HOSPADM

## 2025-01-20 RX ORDER — FLUTICASONE PROPIONATE AND SALMETEROL 250; 50 UG/1; UG/1
1 POWDER RESPIRATORY (INHALATION) 2 TIMES DAILY
Status: DISCONTINUED | OUTPATIENT
Start: 2025-01-20 | End: 2025-01-21

## 2025-01-20 RX ORDER — ONDANSETRON 2 MG/ML
8 INJECTION INTRAMUSCULAR; INTRAVENOUS EVERY 8 HOURS PRN
Status: DISCONTINUED | OUTPATIENT
Start: 2025-01-20 | End: 2025-01-21

## 2025-01-20 RX ORDER — SCOPOLAMINE 1 MG/3D
1 PATCH, EXTENDED RELEASE TRANSDERMAL ONCE
Status: DISCONTINUED | OUTPATIENT
Start: 2025-01-20 | End: 2025-01-20

## 2025-01-20 RX ORDER — NALOXONE HYDROCHLORIDE 0.4 MG/ML
0.08 INJECTION, SOLUTION INTRAMUSCULAR; INTRAVENOUS; SUBCUTANEOUS AS NEEDED
Status: DISCONTINUED | OUTPATIENT
Start: 2025-01-20 | End: 2025-01-20 | Stop reason: HOSPADM

## 2025-01-20 RX ADMIN — DEXAMETHASONE SODIUM PHOSPHATE 8 MG: 4 MG/ML VIAL (ML) INJECTION at 14:59:00

## 2025-01-20 RX ADMIN — LIDOCAINE HYDROCHLORIDE 4 ML: 40 SOLUTION TOPICAL at 14:56:00

## 2025-01-20 RX ADMIN — ROCURONIUM BROMIDE 10 MG: 10 INJECTION, SOLUTION INTRAVENOUS at 14:55:00

## 2025-01-20 RX ADMIN — LIDOCAINE HYDROCHLORIDE 50 MG: 10 INJECTION, SOLUTION EPIDURAL; INFILTRATION; INTRACAUDAL; PERINEURAL at 14:55:00

## 2025-01-20 RX ADMIN — ONDANSETRON 4 MG: 2 INJECTION INTRAMUSCULAR; INTRAVENOUS at 16:01:00

## 2025-01-20 RX ADMIN — SODIUM CHLORIDE, SODIUM LACTATE, POTASSIUM CHLORIDE, CALCIUM CHLORIDE: 600; 310; 30; 20 INJECTION, SOLUTION INTRAVENOUS at 14:50:00

## 2025-01-20 RX ADMIN — CEFAZOLIN SODIUM 3 G: 1 INJECTION, POWDER, FOR SOLUTION INTRAMUSCULAR; INTRAVENOUS at 14:59:00

## 2025-01-20 RX ADMIN — ROCURONIUM BROMIDE 30 MG: 10 INJECTION, SOLUTION INTRAVENOUS at 14:59:00

## 2025-01-20 NOTE — ANESTHESIA POSTPROCEDURE EVALUATION
[facility]    Isabela Diallo Patient Status:  Hospital Outpatient Surgery   Age/Gender 44 year old female MRN [id number]   Location [facility] SURGERY Attending Danna Hatfield MD   Hosp Day # 0 PCP No primary care provider on file.       Anesthesia Post-op Note    RIGHT INTRANASAL LESION EXCISION, BILATERAL TURBINATE REDUCTION AND SEPTOPLASTY    Procedure Summary       Date: 01/20/25 Room / Location:  MAIN OR 04 /  MAIN OR    Anesthesia Start: 1450 Anesthesia Stop: 1625    Procedure: RIGHT INTRANASAL LESION EXCISION, BILATERAL TURBINATE REDUCTION AND SEPTOPLASTY (Bilateral: Nose) Diagnosis: (DEVIATED SEPTUM TURBINATE HYPERTROPHY)    Surgeons: Danna Hatfield MD Anesthesiologist: Mateus Prince MD    Anesthesia Type: general ASA Status: 3            Anesthesia Type: general    Vitals Value Taken Time   /96 01/20/25 1625   Temp 97.8 01/20/25 1625   Pulse 80 01/20/25 1625   Resp 15 01/20/25 1625   SpO2 97 01/20/25 1625           Patient Location: PACU    Anesthesia Type: general    Airway Patency: patent and extubated    Postop Pain Control: adequate    Mental Status: preanesthetic baseline    Nausea/Vomiting: none    Cardiopulmonary/Hydration status: stable euvolemic    Complications: no apparent anesthesia related complications    Postop vital signs: stable    Dental Exam: Unchanged from Preop    Patient to be discharged from PACU when criteria met.

## 2025-01-20 NOTE — ANESTHESIA PROCEDURE NOTES
Airway  Date/Time: 1/20/2025 2:56 PM  Urgency: elective    Airway not difficult    General Information and Staff    Patient location during procedure: OR  Anesthesiologist: Mateus Prince MD  Resident/CRNA: Reji Alva CRNA  Performed: CRNA   Performed by: Reji Alva CRNA  Authorized by: Mateus Prince MD      Indications and Patient Condition  Indications for airway management: anesthesia  Spontaneous Ventilation: absent  Sedation level: deep  Preoxygenated: yes  Patient position: sniffing  Mask difficulty assessment: 0 - not attempted  Planned trial extubation    Final Airway Details  Final airway type: endotracheal airway      Successful airway: ETT  Cuffed: yes   Successful intubation technique: Video laryngoscopy  Endotracheal tube insertion site: oral  Blade: GlideScope  Blade size: #3  ETT size (mm): 7.5    Cormack-Lehane Classification: grade I - full view of glottis  Placement verified by: capnometry   Cuff volume (mL): 6  Measured from: lips  ETT to lips (cm): 23  Number of attempts at approach: 1  Number of other approaches attempted: 0    Additional Comments  Dentition per pre op

## 2025-01-20 NOTE — OPERATIVE REPORT
PATIENT NAME: Isabela Diallo   MRN: [id number]   DATE OF OPERATION: 1/20/2025     PREOPERATIVE DIAGNOSIS:    1. Nasal obstruction   2. Septal deviation   3. Inferior turbinate hypertrophy   4. Right nasal lesion    POSTOPERATIVE DIAGNOSIS:   1. Nasal obstruction   2. Septal deviation   3. Inferior turbinate hypertrophy   4. Right nasal lesion    PROCEDURE:    1. Septoplasty   2. Bilateral Inferior turbinate submucosal resection and outfracturing  3. Right nasal lesion excision     SURGEON: Danna Hatfield MD     ASSISTANT: None.     ANESTHESIA: General.     INDICATION: The patient is a 44 year old female who presents with deviated nasal septum and inferior turbinate hypertrophy. The patient has a right nasal lesion located along the superior nasal vestibule. Discussions were held with the patient regarding treatment options, including observation or surgery. The patient decided to go ahead with the procedure, giving written consent. she was eager to proceed.     FINDINGS: right nasal vestibular lesion, bilateral septal deviation, turbinate hypertrophy    DESCRIPTION OF PROCEDURE: The patient was identified in the preoperative holding area and again in the operating room. The patient was moved from the stretcher to the operating room table and turned over to Anesthesia for sedation and intubation. The patient was sedated and intubated without complication. A time-out was performed confirming the patient's name, age, date of birth, procedure, and allergies. The patient was then turned over to the surgeon for the procedure.     The right internal lesion was infiltrated with 0.5mls of 1%lidocaine with 1:[id number] epinephrine. A circumferential incision was made around the lesion. It was then excised with a sharp scissor.     Attention was turned to the septoplasty. Cocaine pledges were placed bilaterally. 1% lidocaine with 1/100,000 of epinephrine was then injected along the septum bilaterally. A Hemitransifixion incision was  made in the left nasal passage with a 15 blade. Dissection was carried down to the subperichondrial plane using a elaine and freer. Once that plane was reached, a mucosal flap was raised posteriorly on the left past the deviation. A cartilage knife was then used to incise the nasal septum leaving greater than 1.5 centimeter of anterior cartilage for a strut. A mucosal flap was raised on the right in the subperichondrial plane. Once the mucosa was cleared from the cartilage, a swivel knife was used to resect a portion of cartilage. Double action instruments were then used to resection portions of deviated bone. Once the deviation was corrected, a portion of removed cartilage was morselized and replaced. The incision was then closed with 4.0 chromic and a quilting stitch was placed using a 4.0 plain gut on a nikki needle.     Attention was turned to the bilateral inferior turbinate submucosal reduction and outfracturing. 1% lidocaine with 1/100,000 of epinephrine was then injected in the head of both inferior turbinates. Stab incisions with the 15 blade were made in the head of the inferior turbinates. Using a microdebrider with 2mm inferior turbinate blade, a submucosal path was made from anterior to posterior on the left. Submucosa was then resected with the microdebrider. The same steps were performed on the right. Finally, a Jolo elevator was used to outfracture the inferior turbinate on the left and then on the right.     A live splint was then placed and sutured in place with a prolene.     The patient was turned over to anesthesia for wake-up. Patient woke up without complications. she was transferred from the operating room table to the stretcher and from the stretcher to the PACU in stable condition.     SPECIMENS: septum.    ESTIMATED BLOOD LOSS: 25 mls.     COMPLICATIONS: None.     PLAN: Patient will take antibiotics x 1 week and pain medication. she will take sinus precautions x 2 weeks.

## 2025-01-20 NOTE — H&P
Pre-op Diagnosis: DEVIATED SEPTUM TURBINATE HYPERTROPHY    The  H&P by Dr. Rliey dated 1/10/25 and 1/13/25 by Dr. Matute in care everywhere was reviewed by Danna Hatfield MD on 1/20/2025, the patient was examined and no significant changes have occurred in the patient's condition since the H&P was performed.  I discussed with the patient and/or legal representative the potential benefits, risks and side effects of this procedure; the likelihood of the patient achieving goals; and potential problems that might occur during recuperation.  I discussed reasonable alternatives to the procedure, including risks, benefits and side effects related to the alternatives and risks related to not receiving this procedure.  We will proceed with procedure as planned.

## 2025-01-20 NOTE — ANESTHESIA PREPROCEDURE EVALUATION
PRE-OP EVALUATION    Patient Name: Isabela Diallo    Admit Diagnosis: DEVIATED SEPTUM TURBINATE HYPERTROPHY    Pre-op Diagnosis: DEVIATED SEPTUM TURBINATE HYPERTROPHY    RIGHT INTRANASAL LESION EXCISION, BILATERAL TURBINATE REDUCTION AND SEPTOPLASTY    Anesthesia Procedure: RIGHT INTRANASAL LESION EXCISION, BILATERAL TURBINATE REDUCTION AND SEPTOPLASTY (Bilateral)    Surgeons and Role:     * Danna Hatfield MD - Primary    Pre-op vitals reviewed.  Temp: 98.2 °F (36.8 °C)  Pulse: 82  Resp: 18  BP: 186/81  SpO2: 97 %  Body mass index is 43.62 kg/m².    Current medications reviewed.  Hospital Medications:   acetaminophen (Tylenol Extra Strength) tab 1,000 mg  1,000 mg Oral Once    scopolamine (Transderm-Scop) 1 MG/3DAYS patch 1 patch  1 patch Transdermal Once    lactated ringers infusion   Intravenous Continuous       Outpatient Medications:   Prescriptions Prior to Admission[1]    Allergies: Zoloft, Morphine [morphine sulfate in dextrose], Chicken, Gluten flour, Hydrocodone, and Sulfa antibiotics      Anesthesia Evaluation    Patient summary reviewed.    Anesthetic Complications           GI/Hepatic/Renal                (+) liver disease                 Cardiovascular                (+) obesity                                       Endo/Other    Negative endo/other ROS.                              Pulmonary      (+) asthma         (+) shortness of breath            Neuro/Psych                 (+) neuromuscular disease                     Past Surgical History:   Procedure Laterality Date    Chemotherapy      Cholecystectomy      Colonoscopy  11/2017    int hemorrhoids, random colon biopsies normal, repeat colon age 48    Egd  11/2017    gastritis, bx neg for celiac and HP    Hysterectomy      BASSAM/BSO    Amy localization wire 1 site left (cpt=19281)      Other surgical history      laparoscopies     Social History     Socioeconomic History    Marital status:    Tobacco Use    Smoking status: Never    Smokeless  tobacco: Never   Vaping Use    Vaping status: Never Used   Substance and Sexual Activity    Alcohol use: No    Drug use: No     History   Drug Use No     Available pre-op labs reviewed.               Airway      Mallampati: II       Cardiovascular    Cardiovascular exam normal.         Dental    Dentition appears grossly intact         Pulmonary    Pulmonary exam normal.                 Other findings              ASA: 3   Plan: general  NPO status verified and           Plan/risks discussed with: patient                Present on Admission:  **None**             [1]   Medications Prior to Admission   Medication Sig Dispense Refill Last Dose/Taking    levothyroxine 50 MCG Oral Tab Take 1 tablet (50 mcg total) by mouth before breakfast.   1/19/2025 Morning    Cholecalciferol (VITAMIN D-3 OR) Take by mouth. 1000 international units per day   Past Week    Misc Natural Products (GLUCOSAMINE CHOND MSM FORMULA) Oral Tab Take by mouth.   Past Month    Albuterol Sulfate HFA (PROAIR HFA) 108 (90 Base) MCG/ACT Inhalation Aero Soln Inhale 1 puff into the lungs every 6 (six) hours as needed for Wheezing. 8.5 g 5 Taking As Needed    albuterol sulfate (2.5 MG/3ML) 0.083% Inhalation Nebu Soln Take 3 mL (2.5 mg total) by nebulization every 4 (four) hours as needed for Wheezing. 100 mL 1 1/20/2025 at  9:00 AM    Ibuprofen 200 MG Oral Tab Take 2 tablets (400 mg total) by mouth every 6 (six) hours as needed.   Past Month    aspirin-acetaminophen-caffeine 250-250-65 MG Oral Tab Take 1 tablet by mouth every 6 (six) hours as needed.   Past Month    IMODIUM A-D OR Take 1 tablet by mouth every other day.   Taking    ALPRAZolam 0.25 MG Oral Tab Take on tablet 1/2 hour prior to procedure (Patient not taking: Reported on 11/14/2022) 10 tablet 0     fluticasone-salmeterol 250-50 MCG/DOSE Inhalation Aerosol Powder, Breath Activated INHALE ONE PUFF BY MOUTH EVERY TWELVE HOURS AS NEEDED 60 each 2 Unknown

## 2025-01-20 NOTE — DISCHARGE INSTRUCTIONS
Instructions after Septoplasty Surgery  Recovery  Do not be discouraged if you cannot breathe through your nose at first.  Nasal congestion and nasal drainage are common symptoms after surgery.  It typically takes 2-3 weeks before the inflammation and swelling inside the nose have subsided enough to provide a good nasal airway.  You can expect some bloody mucus drainage from your nose for up to one week after surgery.  This drainage will be greatest the first three days, during which time you may wish to keep a gauze bandage taped beneath your nose.  Remember, your body is undergoing a gradual healing process.  Soon you will be feeling better than ever.    Diet  Start with clear liquids.  You may advance your diet as tolerated.  You can eat and drink whatever you like.      Activity   Initially, start with light activity only.  Do not to blow your nose immediately after surgery, as this may cause bleeding.  After one week, you may blow your nose gently if necessary.  Sneeze with your mouth open.  No travel or long trips for two weeks after surgery.  Heavy lifting, straining and exercise may cause nasal bleeding and should be avoided during the first two weeks.  Thereafter, as you improve, you may gradually increase your level of activity.  You may return to work or school when you feel better. Do not be surprised if you tire more easily than usual; that is your body’s way of telling you that you should slow down.      Cleaning  The best way to clear your nose of mucus and dried blood is with saline irrigations, which you may start after the packing is removed from your nose.  These irrigations are done with a bulb syringe (which can be purchased at any drug store) and a glass of lukewarm water containing a teaspoon of salt (so that the water is slightly salty to taste.)  After filling the syringe with salt water, lean over a sink and irrigate each nostril with the solution.  If done correctly, mucus will be  flushed or suctioned out of the front of the nose; some mucus may even be rinsed to the back of the nose and out the mouth.  Using one glass full of salt water for both nostrils, this procedure should be started the day your nasal splints are removed and repeated at least twice a day for one week.  At first these irrigations may feel unusual, but soon you will find them to be comfortable and soothing.  You may wish to irrigate more often than twice a day, which is fine.  Don’t worry about flushing too hard; the irrigations can be vigorous, so long as they don’t cause new bleeding.  After irrigating, apply a thin coat of Vaseline to the front of your nose using a Q tip to keep the area moist and prevent crusting.      Medications  Resume the medications you were taking prior to surgery.  You will be given a prescription for an antibiotic and a pain medication to take at home.  Directions will be on the bottles.  Pain following nasal surgery is usually mild and readily controlled with medication.  Do not be afraid to take a pain pill if you are uncomfortable, especially when going to bed at night or awakening in the morning.  Sleeping with your head elevated (at least on 2 pillows) helps decrease pain and swelling.     Concerns  Bleeding.  A small amount of bleeding may be noted, ranging from a small clot to blood tinged saliva/mucus.  Please call the office or come to the Emergency Room should you develop brisk, new bleeding, which does not stop after a few minutes of sitting up and squeezing your nostrils together.  Fever.  Any temperature above 101 degrees should be treated with acetaminophen (Tylenol).  If the temperature does not respond to Tylenol, please call the office.     Follow Up  If you have packing in your nose, this should be removed 1-2 days after surgery.  If you have splints in your nose, these will be removed 1 week after surgery.  Please call the office at [phone] to schedule these  appointments with Dr. Hatfield, if not already arranged.

## 2025-01-21 VITALS
HEART RATE: 63 BPM | SYSTOLIC BLOOD PRESSURE: 137 MMHG | RESPIRATION RATE: 18 BRPM | WEIGHT: 293 LBS | HEIGHT: 70 IN | DIASTOLIC BLOOD PRESSURE: 77 MMHG | TEMPERATURE: 98 F | BODY MASS INDEX: 41.95 KG/M2 | OXYGEN SATURATION: 92 %

## 2025-01-21 NOTE — PROGRESS NOTES
A/Ox4, RA, SL, tolerating CLD well, advancing as tolerated.  Voiding freely post op.  Mustache dressing in place to nose with moderate sanguinous drainage upon assessment, dressing changed prn.  Pain managed with prn medication, no complaints of nausea this evening.  Pt updated on POC, call light and belongings within reach, questions and concerns addressed.

## 2025-01-21 NOTE — PROGRESS NOTES
Patient discharged home. Iv access removed. Instructions provided to patient and  at bedside. Patient educated on taking norco prn and antibiotic prescribed to pharmacy. Reviewed diet, activity, medication list, medications not to take and follow up appointment. Both verbalize understanding. Patient given extra gauze and tape. Patient escorted via wheelchair. No acute distress present upon discharge.

## (undated) DEVICE — SUT PROL 2-0 30IN CT-2 NABSRB BLU L26MM 1/2 C

## (undated) DEVICE — AVANOS* TUOHY EPIDURAL NEEDLE: Brand: AVANOS

## (undated) DEVICE — Device

## (undated) DEVICE — BLADE 1882040 5PK INFERIOR TURB 2MM

## (undated) DEVICE — SYRINGE MED 10ML LL CTRL W/ FNGR GRP CLR BRL

## (undated) DEVICE — SUT CHRM GUT 4-0 27IN RB-1 ABSRB UD 17MM 1/2

## (undated) DEVICE — PACK CDS SINUS

## (undated) DEVICE — GLOVE SURG SENSICARE SZ 7-1/2

## (undated) DEVICE — SPLINT 1524050 5PK PAIR DOYLE II AIRWAY: Brand: DOYLE II ™

## (undated) DEVICE — DALE NASAL DRESSING HOLDER, FITS MOST: Brand: DALE NASAL DRESSING HOLDER

## (undated) DEVICE — 40400 ADULT HEAD REST: Brand: 40400 ADULT HEAD REST

## (undated) DEVICE — PAIN TRAY: Brand: MEDLINE INDUSTRIES, INC.

## (undated) DEVICE — STERILE SYNTHETIC POLYISOPRENE POWDER-FREE SURGICAL GLOVES WITH HYDROGEL COATING, SMOOTH FINISH, STRAIGHT FINGER: Brand: PROTEXIS

## (undated) DEVICE — SLEEVE COMPR MD KNEE LEN SGL USE KENDALL SCD

## (undated) DEVICE — SUT PLN GUT 4-0 18IN SC-1 ABSRB TAN YELLOWISH

## (undated) DEVICE — BANDAID COVERLET 1X3

## (undated) DEVICE — GLOVE SURG SENSICARE SZ 6-1/2

## (undated) DEVICE — SOLUTION IRRIG 1000ML 0.9% NACL USP BTL

## (undated) NOTE — LETTER
Date & Time: 11/14/2022, 1:45 PM  Patient: Jeff Velazquez  Encounter Provider(s):    GELACIO Mcneill       To Whom It May Concern:    Thuy Jackson was seen and treated in our department on 11/14/2022. She should be excused from work through 11/16/2022.     If you have any questions or concerns, please do not hesitate to call.        _____________________________  Physician/APC Signature

## (undated) NOTE — ED AVS SNAPSHOT
[facility] in [place] [address]    Phone:  [phone]    Fax:  [phone]           Taiwo Esteban   MRN: [id number]    Department:  [facility]   Date of Visit:  5/2 It is our goal to assure that you are completely satisfied with every aspect of your visit today.   In an effort to constantly improve our service to you, we would appreciate any positive or negative feedback related to the care you received in our [employer] account. You may have had testing done that requires us to contact you. Please make sure we have your correct phone number on file.      OUR CURRENT HOURS OF OPERATION:  MONDAY THROUGH FRIDAY 8AM - 8PM  WEEKENDS AND HOLIDAYS 8AM - 6PM    I certifi Sign up for [employer], your secure online medical record. [employer] will allow you to access patient instructions from your recent visit,  view other health information, and more. To sign up or find more information, go to [email or web address]